# Patient Record
Sex: MALE | Race: NATIVE HAWAIIAN OR OTHER PACIFIC ISLANDER | Employment: UNEMPLOYED | URBAN - METROPOLITAN AREA
[De-identification: names, ages, dates, MRNs, and addresses within clinical notes are randomized per-mention and may not be internally consistent; named-entity substitution may affect disease eponyms.]

---

## 2018-12-12 ENCOUNTER — SLEEP STUDY (OUTPATIENT)
Dept: SLEEP MEDICINE | Facility: MEDICAL CENTER | Age: 43
End: 2018-12-12
Attending: NURSE PRACTITIONER
Payer: COMMERCIAL

## 2018-12-12 ENCOUNTER — TELEPHONE (OUTPATIENT)
Dept: SLEEP MEDICINE | Facility: MEDICAL CENTER | Age: 43
End: 2018-12-12

## 2018-12-12 DIAGNOSIS — G47.33 OSA (OBSTRUCTIVE SLEEP APNEA): ICD-10-CM

## 2018-12-12 PROCEDURE — 95811 POLYSOM 6/>YRS CPAP 4/> PARM: CPT | Performed by: FAMILY MEDICINE

## 2018-12-13 NOTE — PROCEDURES
Technical summary: The patient underwent a split-night polysomnogram. This was a 16 channel montage study to include a 6 channel EEG, a 2 channel EOG, and chin EMG, left and right leg EMG, a snore channel, a nasal pressure transducer, and a nasal oral airflow semester and a CFLOW pressure transducer.   Respiratory effort was assessed with the use of a thoracic and abdominal monitor and overnight oximetry was obtained. Audio and video recordings were reviewed. This was a fully attended study and sleep stage scoring was performed. The test was technically adequate.    Scoring Criteria: A modification of the the AASM Manual for the Scoring of Sleep and Associated Events, 2012, was used.   Obstructive apnea was scored by cessation of airflow for at least 10 seconds with continuing respiratory effort.  Central apnea was scored by cessation of airflow for at least 10 seconds with no effort.  Hypopnea was scored by a 30% or more reduction in airflow for at least 10 seconds accompanied by an arterial oxygen desaturation of 3% or more.  (For Medicare patients, hypopneas were scored by a 30% or more reduction in airflow for at least 10 seconds accompanied by an arterial oxygen saturation of 4% or more, as required by their insurance, CMS.      General sleep summary:      Diagnostic:  During the overnight study, the sleep latency was 78 min which is prolonged. The REM latency was 64 which is normal. The total sleep time was 141 min and sleep efficiency was 64 % which is  decreased.  Sleep stage proportions showed decreased REM.  In regards to sleep quality there was a mild degree of sleep fragmentation as shown by the arousal index of 13 an hour which is increased. The arousals were due to spontaneous arousal and respiratory events.     Treatment: During the treatment portion of the study, REM was observed.   The total sleep time was 304 min and sleep efficiency was 88 %.  Sleep stage proportions showed no N3 sleep and  increased WASO of 34 min.  In regards to sleep quality there was a mild degree of sleep fragmentation as shown by the arousal index of 9/hr. The arousals were due to spontaneous arousal.       Respiratory summary:   Diagnostic: During the diagnostic portion of the the study, the patient demonstrated sever obstructive sleep apnea as shown by the apnea hypopnea index of 46.4/hr. There are a total of 7 apneas and 102 hypopneas. In the supine position the respiratory disturbance index was 46 an hour and in the non-supine position the respiratory disturbance index was 0 per hour. The O2 lavon was 82% and the average SpO2 was 92 %. He spent 8 min of sleep time below 89% O2 saturation. There was snoring. The patient demonstrated a NSR with an average heartbeat of 64 bpm.     CPAP Titration:  Due to the significant number of obstructive respiratory events observed during the diagnostic portion of the study a CPAP titration trial was performed during the second half of the night. The CPAP pressure was initiated at 5 cm of water and the pressure was increased in an attempt to eliminate all sleep disordered breathing and snoring. The CPAP pressure was increased to CPAP 10 cm water and at this final pressure the patient was observed in non supine REM sleep stage. The apnea hypopnea index was 20/hr with improved O2 lavon was in high 80's.He spent 45 min of sleep time below 89% O2 saturation Snoring was resolved.  The patient continued to demonstrate NSR  and the average HR was 64. The patient utilized small simplus mask with heated humidification. The CPAP was well-tolerated and there was minimal air leaks. No supplemental oxygen was required.    Periodic limb movement summary:   Diagnostic:The patient demonstrated an normal degree of periodic limb movements as shown by the PLM index of 0.9 per hour and normal PLM arousal index of 0.9/hr.    Treatment:The patient demonstrated an normal degree of periodic limb movements as shown  by the PLM index of 0 per hour and normal PLM arousal index of 0/hr.  Impression:  1.  Sever obstructive sleep apnea with AHI of 46.4/hr and O2 lavon 82 %. Due to severity of the disease he met the split study protocol. The titration started with CPAP 5 cm and the best tolerated was CPAP 10 cm. The AHI improved to 20/hr.   2.Sleep related hypoxia      Recommendations:  No definitive pressure can be extrapolated from the titration, I recommend dedicated CPAP/BiPAP titration starting at CPAP 11 cm. In some cases alternative treatment options may prove effective in resolving sleep apnea and these options include upper airway surgery, the use of a dental orthotic or weight loss and positional therapy. Clinical correlation is required. In general patients with sleep apnea are advised to avoid alcohol and sedatives and to not operate a motor vehicle while drowsy and are at a greater risk for cardiovascular disease.